# Patient Record
Sex: FEMALE | Race: WHITE | NOT HISPANIC OR LATINO | URBAN - METROPOLITAN AREA
[De-identification: names, ages, dates, MRNs, and addresses within clinical notes are randomized per-mention and may not be internally consistent; named-entity substitution may affect disease eponyms.]

---

## 2017-01-05 ENCOUNTER — IMPORTED ENCOUNTER (OUTPATIENT)
Dept: URBAN - METROPOLITAN AREA CLINIC 50 | Facility: CLINIC | Age: 72
End: 2017-01-05

## 2017-01-05 NOTE — PATIENT DISCUSSION
"""Cataract(s) are not visually significant for cataract surgery. Monitor by regular examinations. "" ""Patient elects to change glasses.  Rx given. """

## 2017-01-05 NOTE — PATIENT DISCUSSION
"""Follow dry ARMD without treatment. MVI/AREDS/Amsler. Patient to call if vision changes or distortion increases. Good diet/do not smoke. """

## 2017-07-13 ENCOUNTER — IMPORTED ENCOUNTER (OUTPATIENT)
Dept: URBAN - METROPOLITAN AREA CLINIC 50 | Facility: CLINIC | Age: 72
End: 2017-07-13

## 2017-08-01 ENCOUNTER — IMPORTED ENCOUNTER (OUTPATIENT)
Dept: URBAN - METROPOLITAN AREA CLINIC 50 | Facility: CLINIC | Age: 72
End: 2017-08-01

## 2017-11-09 ENCOUNTER — IMPORTED ENCOUNTER (OUTPATIENT)
Dept: URBAN - METROPOLITAN AREA CLINIC 50 | Facility: CLINIC | Age: 72
End: 2017-11-09

## 2017-12-05 ENCOUNTER — IMPORTED ENCOUNTER (OUTPATIENT)
Dept: URBAN - METROPOLITAN AREA CLINIC 50 | Facility: CLINIC | Age: 72
End: 2017-12-05

## 2017-12-15 ENCOUNTER — IMPORTED ENCOUNTER (OUTPATIENT)
Dept: URBAN - METROPOLITAN AREA CLINIC 50 | Facility: CLINIC | Age: 72
End: 2017-12-15

## 2017-12-20 ENCOUNTER — IMPORTED ENCOUNTER (OUTPATIENT)
Dept: URBAN - METROPOLITAN AREA CLINIC 50 | Facility: CLINIC | Age: 72
End: 2017-12-20

## 2017-12-20 NOTE — PATIENT DISCUSSION
"""S/P IOL OS: Sensar AAB00 28.0 +TM/Omidria. Continue post operative instructions and drops per schedule.  """

## 2017-12-21 ENCOUNTER — IMPORTED ENCOUNTER (OUTPATIENT)
Dept: URBAN - METROPOLITAN AREA CLINIC 50 | Facility: CLINIC | Age: 72
End: 2017-12-21

## 2017-12-29 ENCOUNTER — IMPORTED ENCOUNTER (OUTPATIENT)
Dept: URBAN - METROPOLITAN AREA CLINIC 50 | Facility: CLINIC | Age: 72
End: 2017-12-29

## 2018-01-03 ENCOUNTER — IMPORTED ENCOUNTER (OUTPATIENT)
Dept: URBAN - METROPOLITAN AREA CLINIC 50 | Facility: CLINIC | Age: 73
End: 2018-01-03

## 2018-01-03 NOTE — PATIENT DISCUSSION
"""S/P IOL OD: Sensar AAB00 27 +TM/Omidria. Continue post operative instructions and drops per schedule.  """

## 2018-01-12 ENCOUNTER — IMPORTED ENCOUNTER (OUTPATIENT)
Dept: URBAN - METROPOLITAN AREA CLINIC 50 | Facility: CLINIC | Age: 73
End: 2018-01-12

## 2018-02-02 ENCOUNTER — IMPORTED ENCOUNTER (OUTPATIENT)
Dept: URBAN - METROPOLITAN AREA CLINIC 50 | Facility: CLINIC | Age: 73
End: 2018-02-02

## 2018-03-05 ENCOUNTER — IMPORTED ENCOUNTER (OUTPATIENT)
Dept: URBAN - METROPOLITAN AREA CLINIC 50 | Facility: CLINIC | Age: 73
End: 2018-03-05

## 2018-05-15 ENCOUNTER — IMPORTED ENCOUNTER (OUTPATIENT)
Dept: URBAN - METROPOLITAN AREA CLINIC 50 | Facility: CLINIC | Age: 73
End: 2018-05-15

## 2018-11-15 ENCOUNTER — IMPORTED ENCOUNTER (OUTPATIENT)
Dept: URBAN - METROPOLITAN AREA CLINIC 50 | Facility: CLINIC | Age: 73
End: 2018-11-15

## 2019-05-09 ENCOUNTER — IMPORTED ENCOUNTER (OUTPATIENT)
Dept: URBAN - METROPOLITAN AREA CLINIC 50 | Facility: CLINIC | Age: 74
End: 2019-05-09

## 2019-10-04 ENCOUNTER — IMPORTED ENCOUNTER (OUTPATIENT)
Dept: URBAN - METROPOLITAN AREA CLINIC 50 | Facility: CLINIC | Age: 74
End: 2019-10-04

## 2021-04-17 ASSESSMENT — TONOMETRY
OS_IOP_MMHG: 25
OD_IOP_MMHG: 15
OD_IOP_MMHG: 16
OD_IOP_MMHG: 16
OD_IOP_MMHG: 14
OD_IOP_MMHG: 17
OS_IOP_MMHG: 14
OD_IOP_MMHG: 10
OD_IOP_MMHG: 16
OS_IOP_MMHG: 26
OS_IOP_MMHG: 14
OD_IOP_MMHG: 18
OS_IOP_MMHG: 16
OS_IOP_MMHG: 17
OD_IOP_MMHG: 17
OD_IOP_MMHG: 08
OD_IOP_MMHG: 14
OD_IOP_MMHG: 19
OD_IOP_MMHG: 14
OS_IOP_MMHG: 15
OD_IOP_MMHG: 16
OS_IOP_MMHG: 18
OS_IOP_MMHG: 17
OS_IOP_MMHG: 13
OS_IOP_MMHG: 15
OD_IOP_MMHG: 14
OD_IOP_MMHG: 18
OD_IOP_MMHG: 13
OS_IOP_MMHG: 16
OS_IOP_MMHG: 15
OS_IOP_MMHG: 14
OD_IOP_MMHG: 15
OS_IOP_MMHG: 17
OS_IOP_MMHG: 16
OS_IOP_MMHG: 14
OS_IOP_MMHG: 16
OS_IOP_MMHG: 14
OS_IOP_MMHG: 17
OD_IOP_MMHG: 14
OD_IOP_MMHG: 14

## 2021-04-17 ASSESSMENT — VISUAL ACUITY
OS_CC: J4
OD_CC: J4
OS_CC: 20/30-1
OD_SC: 20/25
OD_CC: J3
OS_CC: 20/50-
OS_PH: 20/25
OS_CC: 20/25-1
OS_SC: 20/30
OS_CC: J1+
OS_SC: 20/25
OS_CC: 20/30-
OD_CC: J2@ 16 IN
OD_BAT: 20/200
OD_SC: 20/30
OD_BAT: 20/40
OD_CC: 20/30
OS_CC: 20/25
OS_BAT: 20/25
OD_BAT: 20/40
OD_OTHER: 20/200.
OS_BAT: 20/200
OS_OTHER: 20/30. 20/60.
OD_OTHER: 20/200. 20/400.
OD_SC: 20/25
OD_OTHER: 20/40. 20/70.
OS_CC: 20/30
OS_BAT: 20/30
OS_BAT: 20/40
OD_SC: 20/60
OD_BAT: 20/200
OS_OTHER: 20/40. 20/50.
OS_SC: 20/20-1
OD_CC: J1+
OD_BAT: 20/30
OD_CC: 20/30
OD_OTHER: 20/40. 20/40.
OD_OTHER: 20/30. 20/50.
OS_OTHER: 20/200. 20/200.
OS_BAT: 20/200
OD_SC: 20/25-1
OD_CC: 20/25
OS_CC: J3
OS_CC: J2@ 16 IN
OD_OTHER: 20/200. 20/400.
OD_CC: 20/25-1
OD_SC: 20/25-1
OD_PH: 20/25
OS_BAT: 20/30
OS_CC: 20/30
OD_CC: 20/25
OS_SC: 20/20
OS_OTHER: 20/200. 20/400.
OD_BAT: 20/25
OS_SC: 20/30
OD_CC: 20/30-1
OS_OTHER: 20/30. 20/70.
OD_BAT: 20/200
OS_PH: 20/25

## 2021-04-17 ASSESSMENT — PACHYMETRY
OD_CT_UM: 511
OS_CT_UM: 523
OD_CT_UM: 511
OS_CT_UM: 523
OD_CT_UM: 511
OS_CT_UM: 523
OD_CT_UM: 511
OS_CT_UM: 523
OS_CT_UM: 523
OD_CT_UM: 511
OS_CT_UM: 523
OD_CT_UM: 511
OS_CT_UM: 523
OD_CT_UM: 511
OS_CT_UM: 523
OD_CT_UM: 511